# Patient Record
Sex: FEMALE | Race: BLACK OR AFRICAN AMERICAN | NOT HISPANIC OR LATINO | Employment: UNEMPLOYED | URBAN - METROPOLITAN AREA
[De-identification: names, ages, dates, MRNs, and addresses within clinical notes are randomized per-mention and may not be internally consistent; named-entity substitution may affect disease eponyms.]

---

## 2023-05-31 ENCOUNTER — SOCIAL WORK (OUTPATIENT)
Dept: ADMINISTRATIVE | Facility: OTHER | Age: 28
End: 2023-05-31

## 2023-05-31 NOTE — PROGRESS NOTES
SW met with pt regarding initial OB assessment. Pt stated this is her 6th pregnancy/2-miscarriage. Pt stated lives alone with her children-10,9,6 and able to perform ADL's independently. Pt stated does not work. Pt stated support system is her grandmother/Coni. Pt stated has medicaid([employer]). Pt stated does not have WIC. Pt stated not going to breastfeed. SW provide pt with information on other community resources.JESICA faxed and scanned pt's notification of pregnancy into epic.  No other needs identified at this time.    JEAN CARLOS Vela  Pager#[phone]

## 2023-07-19 PROBLEM — O09.33 LIMITED PRENATAL CARE IN THIRD TRIMESTER: Status: ACTIVE | Noted: 2023-07-19

## 2023-07-19 PROBLEM — Z87.59 HISTORY OF VACUUM EXTRACTION ASSISTED DELIVERY: Status: ACTIVE | Noted: 2023-07-19

## 2023-07-19 PROBLEM — Z98.891 H/O CESAREAN SECTION: Status: ACTIVE | Noted: 2023-07-19

## 2023-07-19 PROBLEM — Z86.32 HISTORY OF GESTATIONAL DIABETES: Status: ACTIVE | Noted: 2023-07-19

## 2023-07-19 PROBLEM — O09.90 SUPERVISION OF HIGH RISK PREGNANCY, ANTEPARTUM: Status: ACTIVE | Noted: 2023-07-19

## 2023-09-11 PROBLEM — R51.9 HEADACHE: Status: ACTIVE | Noted: 2023-09-11

## 2023-09-12 PROBLEM — O99.019 ANTEPARTUM ANEMIA: Status: ACTIVE | Noted: 2023-09-12

## 2023-09-12 PROBLEM — B95.1 POSITIVE GBS TEST: Status: ACTIVE | Noted: 2023-09-12

## 2023-09-15 PROBLEM — R51.9 HEADACHE: Status: RESOLVED | Noted: 2023-09-11 | Resolved: 2023-09-15

## 2023-09-15 PROBLEM — Z37.9 NORMAL LABOR: Status: ACTIVE | Noted: 2023-09-15

## 2023-12-18 PROBLEM — Z37.9 NORMAL LABOR: Status: RESOLVED | Noted: 2023-09-15 | Resolved: 2023-12-18

## 2024-11-27 ENCOUNTER — PATIENT MESSAGE (OUTPATIENT)
Dept: RESEARCH | Facility: HOSPITAL | Age: 29
End: 2024-11-27